# Patient Record
Sex: MALE | Race: WHITE | NOT HISPANIC OR LATINO | Employment: FULL TIME | URBAN - METROPOLITAN AREA
[De-identification: names, ages, dates, MRNs, and addresses within clinical notes are randomized per-mention and may not be internally consistent; named-entity substitution may affect disease eponyms.]

---

## 2021-06-19 ENCOUNTER — IMMUNIZATION (OUTPATIENT)
Dept: VACCINE CLINIC | Facility: HOSPITAL | Age: 41
End: 2021-06-19

## 2021-06-19 PROCEDURE — 0001A: CPT | Performed by: INTERNAL MEDICINE

## 2021-06-19 PROCEDURE — 91300 HC SARSCOV02 VAC 30MCG/0.3ML IM: CPT | Performed by: INTERNAL MEDICINE

## 2021-07-10 ENCOUNTER — IMMUNIZATION (OUTPATIENT)
Dept: VACCINE CLINIC | Facility: HOSPITAL | Age: 41
End: 2021-07-10

## 2021-07-10 PROCEDURE — 0002A: CPT | Performed by: INTERNAL MEDICINE

## 2021-07-10 PROCEDURE — 91300 HC SARSCOV02 VAC 30MCG/0.3ML IM: CPT | Performed by: INTERNAL MEDICINE

## 2022-08-09 ENCOUNTER — OFFICE VISIT (OUTPATIENT)
Dept: INTERNAL MEDICINE | Facility: CLINIC | Age: 42
End: 2022-08-09

## 2022-08-09 VITALS
BODY MASS INDEX: 37.74 KG/M2 | HEART RATE: 96 BPM | HEIGHT: 71 IN | SYSTOLIC BLOOD PRESSURE: 140 MMHG | OXYGEN SATURATION: 97 % | WEIGHT: 269.6 LBS | TEMPERATURE: 98 F | DIASTOLIC BLOOD PRESSURE: 100 MMHG

## 2022-08-09 DIAGNOSIS — Z00.00 ROUTINE HEALTH MAINTENANCE: ICD-10-CM

## 2022-08-09 DIAGNOSIS — D22.9 ATYPICAL NEVUS: Primary | ICD-10-CM

## 2022-08-09 DIAGNOSIS — R03.0 ELEVATED BLOOD PRESSURE READING WITHOUT DIAGNOSIS OF HYPERTENSION: ICD-10-CM

## 2022-08-09 PROCEDURE — 99214 OFFICE O/P EST MOD 30 MIN: CPT | Performed by: NURSE PRACTITIONER

## 2022-08-09 NOTE — PROGRESS NOTES
"Subjective    Calderon Jeffers is a 41 y.o. male presenting today for   Chief Complaint   Patient presents with   • Establish Care     New patient. Brown spot on left leg patient would like looked at.        History of Present Illness     Calderon Jeffers presents today as a new patient to me to establish care.   Prior PCP was no one more many years.      Current/chronic health conditions include:    There is no problem list on file for this patient.      No outpatient medications have been marked as taking for the 8/9/22 encounter (Office Visit) with Viridiana Peoples APRN.         New complaints today include:    He notes a dark skin lesion on his L LE. This has been present for years. It has not changed appreciably. It does not crust or bleed. He has never seen a Dermatologist.      The following portions of the patient's history were reviewed and updated as appropriate: allergies, current medications, problem list, past medical history, past surgical history, family history, and social history.         Objective    Vitals:    08/09/22 1332 08/09/22 1418   BP: 148/96 140/100   BP Location: Right arm    Patient Position: Sitting    Pulse: 96    Temp: 98 °F (36.7 °C)    SpO2: 97%    Weight: 122 kg (269 lb 9.6 oz)    Height: 180.3 cm (71\")      Body mass index is 37.6 kg/m².  Nursing notes and vitals reviewed.    Physical Exam  Constitutional:       General: He is not in acute distress.     Appearance: He is well-developed.   Cardiovascular:      Rate and Rhythm: Regular rhythm.      Heart sounds: Normal heart sounds.   Pulmonary:      Effort: Pulmonary effort is normal.      Breath sounds: Normal breath sounds.   Skin:         Neurological:      Mental Status: He is alert and oriented to person, place, and time.   Psychiatric:         Attention and Perception: He is attentive.         Mood and Affect: Mood and affect normal.         Speech: Speech normal.         Behavior: Behavior normal.         Thought Content: " Thought content normal.         No results found for this or any previous visit (from the past 672 hour(s)).      Assessment and Plan    Diagnoses and all orders for this visit:    1. Atypical nevus (Primary)    2. Elevated blood pressure reading without diagnosis of hypertension        1. Given contact info to schedule with Derm.    2. Check BP 2-3x/wk. Bring log to next OV.      Medications, including side effects, were discussed with the patient. Patient verbalized understanding.  The plan of care was discussed. All questions were answered. Patient verbalized understanding.        Return for fasting labs one week prior to, Annual physical.      I spent 33 minutes caring for Calderon on this date of service. This time includes time spent by me in the following activities:preparing for the visit, performing a medically appropriate examination and/or evaluation , counseling and educating the patient/family/caregiver, ordering medications, tests, or procedures, referring and communicating with other health care professionals , documenting information in the medical record and care coordination

## 2022-08-09 NOTE — PATIENT INSTRUCTIONS
Dermatology:    [facility]  [phone]      [facility]  [phone]

## 2022-11-04 ENCOUNTER — FLU SHOT (OUTPATIENT)
Dept: INTERNAL MEDICINE | Facility: CLINIC | Age: 42
End: 2022-11-04

## 2022-11-04 DIAGNOSIS — Z23 NEED FOR VACCINATION: Primary | ICD-10-CM

## 2022-11-04 PROCEDURE — 90471 IMMUNIZATION ADMIN: CPT | Performed by: NURSE PRACTITIONER

## 2022-11-04 PROCEDURE — 90686 IIV4 VACC NO PRSV 0.5 ML IM: CPT | Performed by: NURSE PRACTITIONER

## 2023-02-19 ENCOUNTER — HOSPITAL ENCOUNTER (EMERGENCY)
Facility: HOSPITAL | Age: 43
Discharge: HOME | End: 2023-02-20
Attending: EMERGENCY MEDICINE | Admitting: EMERGENCY MEDICINE
Payer: COMMERCIAL

## 2023-02-19 DIAGNOSIS — M54.32 SCIATICA OF LEFT SIDE: Primary | ICD-10-CM

## 2023-02-19 DIAGNOSIS — M48.00 CENTRAL STENOSIS OF SPINAL CANAL: ICD-10-CM

## 2023-02-19 DIAGNOSIS — M47.816 FACET ARTHRITIS OF LUMBAR REGION: ICD-10-CM

## 2023-02-19 PROCEDURE — 99283 EMERGENCY DEPT VISIT LOW MDM: CPT

## 2023-02-19 PROCEDURE — 25010000002 METHYLPREDNISOLONE PER 40 MG: Performed by: EMERGENCY MEDICINE

## 2023-02-19 PROCEDURE — 25010000002 KETOROLAC TROMETHAMINE PER 15 MG: Performed by: EMERGENCY MEDICINE

## 2023-02-19 PROCEDURE — 96372 THER/PROPH/DIAG INJ SC/IM: CPT

## 2023-02-19 RX ORDER — CYCLOBENZAPRINE HCL 10 MG
10 TABLET ORAL ONCE
Status: COMPLETED | OUTPATIENT
Start: 2023-02-19 | End: 2023-02-19

## 2023-02-19 RX ORDER — HYDROCODONE BITARTRATE AND ACETAMINOPHEN 5; 325 MG/1; MG/1
2 TABLET ORAL ONCE
Status: COMPLETED | OUTPATIENT
Start: 2023-02-19 | End: 2023-02-19

## 2023-02-19 RX ORDER — KETOROLAC TROMETHAMINE 30 MG/ML
60 INJECTION, SOLUTION INTRAMUSCULAR; INTRAVENOUS ONCE
Status: COMPLETED | OUTPATIENT
Start: 2023-02-19 | End: 2023-02-19

## 2023-02-19 RX ORDER — METHYLPREDNISOLONE SODIUM SUCCINATE 40 MG/ML
80 INJECTION, POWDER, LYOPHILIZED, FOR SOLUTION INTRAMUSCULAR; INTRAVENOUS ONCE
Status: COMPLETED | OUTPATIENT
Start: 2023-02-19 | End: 2023-02-19

## 2023-02-19 RX ADMIN — HYDROCODONE BITARTRATE AND ACETAMINOPHEN 2 TABLET: 5; 325 TABLET ORAL at 23:51

## 2023-02-19 RX ADMIN — KETOROLAC TROMETHAMINE 60 MG: 30 INJECTION, SOLUTION INTRAMUSCULAR; INTRAVENOUS at 23:51

## 2023-02-19 RX ADMIN — CYCLOBENZAPRINE 10 MG: 10 TABLET, FILM COATED ORAL at 23:51

## 2023-02-19 RX ADMIN — METHYLPREDNISOLONE SODIUM SUCCINATE 80 MG: 40 INJECTION, POWDER, FOR SOLUTION INTRAMUSCULAR; INTRAVENOUS at 23:50

## 2023-02-20 ENCOUNTER — APPOINTMENT (OUTPATIENT)
Dept: CT IMAGING | Facility: HOSPITAL | Age: 43
End: 2023-02-20
Payer: COMMERCIAL

## 2023-02-20 VITALS
HEIGHT: 71 IN | HEART RATE: 101 BPM | SYSTOLIC BLOOD PRESSURE: 140 MMHG | DIASTOLIC BLOOD PRESSURE: 89 MMHG | OXYGEN SATURATION: 99 % | TEMPERATURE: 98.3 F | WEIGHT: 240 LBS | BODY MASS INDEX: 33.6 KG/M2 | RESPIRATION RATE: 16 BRPM

## 2023-02-20 PROCEDURE — 72131 CT LUMBAR SPINE W/O DYE: CPT

## 2023-02-20 RX ORDER — HYDROCODONE BITARTRATE AND ACETAMINOPHEN 5; 325 MG/1; MG/1
1 TABLET ORAL EVERY 6 HOURS PRN
Qty: 30 TABLET | Refills: 0 | Status: SHIPPED | OUTPATIENT
Start: 2023-02-20

## 2023-02-20 RX ORDER — KETOROLAC TROMETHAMINE 10 MG/1
10 TABLET, FILM COATED ORAL EVERY 6 HOURS PRN
Qty: 20 TABLET | Refills: 0 | Status: SHIPPED | OUTPATIENT
Start: 2023-02-20

## 2023-02-20 RX ORDER — METHYLPREDNISOLONE 4 MG/1
TABLET ORAL
Qty: 21 TABLET | Refills: 0 | Status: SHIPPED | OUTPATIENT
Start: 2023-02-20

## 2023-02-20 RX ORDER — CYCLOBENZAPRINE HCL 10 MG
10 TABLET ORAL 3 TIMES DAILY
Qty: 21 TABLET | Refills: 0 | Status: SHIPPED | OUTPATIENT
Start: 2023-02-20 | End: 2023-02-28

## 2023-02-20 NOTE — ED PROVIDER NOTES
Subjective   History of Present Illness  Calderon Jeffers is a 42-year-old white male who presents secondary to pain in the left hip and buttock.  Onset of pain 2 days ago.  No injury prior to onset of symptoms.  No recent illness.  Patient describes a sharp pain shooting down posterior aspect of his buttock and left hip.  Patient has been taking ibuprofen without significant improvement.  He has been stretching.  Wife bought lidocaine patches which have helped minimally.  Patient reports his difficult sleeping last night secondary to pain.  Patient would like to be able to sleep tonight.  Thus he elected to come to the ED for evaluation.    History provided by:  Patient      Review of Systems   Constitutional: Negative for fever.   HENT: Negative for rhinorrhea.    Eyes: Negative for redness.   Respiratory: Negative for cough.    Cardiovascular: Negative for chest pain.   Gastrointestinal: Negative for abdominal pain.   Genitourinary: Negative for dysuria.   Musculoskeletal: Negative for gait problem.   Skin: Negative for rash.   Neurological: Negative for syncope.   All other systems reviewed and are negative.      History reviewed. No pertinent past medical history.    No Known Allergies    Past Surgical History:   Procedure Laterality Date   • KNEE ACL RECONSTRUCTION Right 1997   • TONSILLECTOMY  1991       Family History   Problem Relation Age of Onset   • Throat cancer Father        Social History     Socioeconomic History   • Marital status:    Tobacco Use   • Smoking status: Never   • Smokeless tobacco: Never   Vaping Use   • Vaping Use: Never used   Substance and Sexual Activity   • Alcohol use: Yes     Alcohol/week: 6.0 standard drinks     Types: 6 Standard drinks or equivalent per week   • Drug use: Never   • Sexual activity: Defer           Objective   Physical Exam  Vitals and nursing note reviewed.   Constitutional:       General: He is not in acute distress.     Appearance: Normal appearance. He  is well-developed. He is not ill-appearing, toxic-appearing or diaphoretic.      Comments: 42-year-old white male lying in bed.  Patient is a bit overweight.  He otherwise appears in good overall health.  Vital signs notable for heart rate 101 blood pressure 148/101.  Patient does appear to be in some discomfort.  He is friendly and cooperative.   HENT:      Head: Normocephalic and atraumatic.      Right Ear: Tympanic membrane, ear canal and external ear normal.      Left Ear: Tympanic membrane, ear canal and external ear normal.      Nose: Nose normal.      Mouth/Throat:      Mouth: Mucous membranes are moist.      Pharynx: Oropharynx is clear.   Eyes:      Extraocular Movements: Extraocular movements intact.      Conjunctiva/sclera: Conjunctivae normal.      Pupils: Pupils are equal, round, and reactive to light.   Cardiovascular:      Rate and Rhythm: Normal rate and regular rhythm.      Pulses: Normal pulses.      Heart sounds: Normal heart sounds. No murmur heard.    No friction rub. No gallop.   Pulmonary:      Effort: Pulmonary effort is normal. No respiratory distress.      Breath sounds: Normal breath sounds. No stridor. No wheezing or rales.   Abdominal:      General: There is no distension.      Palpations: Abdomen is soft. There is no mass.      Tenderness: There is no abdominal tenderness. There is no guarding or rebound.      Hernia: No hernia is present.   Musculoskeletal:         General: Tenderness present.      Cervical back: Normal, normal range of motion and neck supple.      Thoracic back: Decreased range of motion (Mildly decreased secondary to left low back pain.).      Lumbar back: Tenderness present. No swelling, deformity or signs of trauma. Decreased range of motion. Positive right straight leg raise test and positive left straight leg raise test.        Back:    Skin:     General: Skin is warm and dry.      Findings: No erythema or rash.   Neurological:      General: No focal deficit  present.      Mental Status: He is alert and oriented to person, place, and time.      Cranial Nerves: No cranial nerve deficit.      Deep Tendon Reflexes: Reflexes are normal and symmetric.   Psychiatric:         Mood and Affect: Mood normal.         Behavior: Behavior normal.         Procedures           ED Course  ED Course as of 02/20/23 0044   Sun Feb 19, 2023   2345 Pain left buttock and posterior hip.  Consistent with sciatica.  Positive straight leg raise bilaterally.  Obtaining CT of L-spine.  Giving NSAIDs, muscle relaxants, steroids and pain medication.  Wife is at bedside.  She is driving home. [SS]   2345 We will check blood pressure prior to discharge. [SS]   Mon Feb 20, 2023   0032 CT shows central canal stenosis as well as bilateral facet stenosis at L4-L5.  I feel this is the source of patient's pain. [SS]   0039 Patient feeling better.  Responded well to medications.  Discussed at length with patient and his wife all results, diagnoses, treatment and follow-up.  All questions answered.  Will DC home.    Prescription  1-Medrol Dosepak  2-Flexeril  3-Toradol  5-hydrocodone/acetaminophen [SS]      ED Course User Index  [SS] Vinod Jacobsen MD      CT Lumbar Spine Without Contrast    Result Date: 2/20/2023  Narrative: CT Spine Lumbar WO INDICATION:  Low back pain and left-sided sciatica. TECHNIQUE: CT of the lumbar spine without IV contrast. Coronal and sagittal reconstructions were obtained.  Radiation dose reduction techniques included automated exposure control or exposure modulation based on body size. Count of known CT and cardiac nuc med studies performed in previous 12 months: 0. COMPARISON:  None available. FINDINGS: Lumbar alignment is normal with no subluxation identified. There are bilateral L4 pars defects without L4-5 spondylolisthesis. There is no sacroiliac joint diastasis. There is no acute lumbar spine fracture. Paraspinal soft tissues are within normal limits. The visualized aorta  is normal in caliber. There is mild chronic wedging at T12. At L5-S1, the disc is normal. There is mild facet arthropathy. No central canal or foraminal stenosis. At L4-5, there is a broad-based disc osteophyte complex slightly eccentric to the left. This measures up to about 6 mm in depth. There is bilateral facet arthropathy in addition to the L4 pars defects. There is moderate right and moderate to severe left foraminal stenosis in addition to moderate stenosis of the central canal. At L3-4, L2-3 and L1-2, the discs are normal with no central canal or foraminal stenosis. Mild facet arthropathy is present.     Impression: 1. No fracture or malalignment. 2. Bilateral L4 pars defects. 3. Broad-based disc osteophyte complex at L4-5 eccentric to the left with bilateral foraminal stenosis and central canal stenosis as above. Signer Name: Calderon Zee MD  Signed: 2/20/2023 12:28 AM  Workstation Name: [id number]  [facility]    My differential diagnosis for back pain includes but is not limited to:  Musculoskeletal strain, contusion, retroperitoneal hematoma, disc protrusion, vertebral fracture, transverse process fracture, rib fracture, facet syndrome, sacroiliac joint strain, sciatica, renal injury, splenic injury, pancreatic injury, osteoarthritis, lumbar spondylosis, spinal stenosis, ankylosing spondylitis, sacroiliac joint inflammation, pancreatitis, perforated peptic ulcer, diverticulitis, endometriosis, chronic PID, epidural abscess, osteomyelitis, retroperitoneal abscess, pyelonephritis, pneumonia, subphrenic abscess, tuberculosis, neurofibroma, meningioma, multiple myeloma, lymphoma, metastatic cancer, primary cancer, AAA, aortic dissection, spinal ischemia, referred pain, ureterolithiasis    My diagnosis for lower extremity pain and injury includes but is not limited to hip fracture, femur fracture, hip dislocation, hip contusion, hip sprain, hip strain, pelvic fracture,  ischio-tibial band pain, ischio-tibial band bursitis, knee sprain, patella dislocation, knee dislocation, internal derangement of knee, fractures of the femur, tibia, fibula, ankle, foot and digits, ankle sprain, ankle dislocation, Lisfranc fracture, fracture dislocations of the digits, pulmonary embolism, claudication, peripheral vascular disease, gout, osteoarthritis, rheumatoid arthritis, bursitis, septic joint, poly-rheumatica, polyarthralgia and other inflammatory or infectious disease processes.                                       MDM    Final diagnoses:   Sciatica of left side   Facet arthritis of lumbar region   Central stenosis of spinal canal       ED Disposition  ED Disposition     ED Disposition   Discharge    Condition   Stable    Comment   --             No follow-up provider specified.       Medication List      New Prescriptions    cyclobenzaprine 10 MG tablet  Commonly known as: FLEXERIL  Take 1 tablet by mouth 3 (Three) Times a Day for 7 days. Take one half tab if full tab is too sedating.     HYDROcodone-acetaminophen 5-325 MG per tablet  Commonly known as: NORCO  Take 1 tablet by mouth Every 6 (Six) Hours As Needed for Moderate Pain or Severe Pain (2 tabs if needed) for up to 30 doses.     ketorolac 10 MG tablet  Commonly known as: TORADOL  Take 1 tablet by mouth Every 6 (Six) Hours As Needed for Moderate Pain for up to 20 doses.     methylPREDNISolone 4 MG dose pack  Commonly known as: MEDROL  Take as directed on package instructions.           Where to Get Your Medications      These medications were sent to [facility] [place] [address]    Hours: 7:00AM TO 5:00PM MON TO FRI Phone: [phone]   · cyclobenzaprine 10 MG tablet  · HYDROcodone-acetaminophen 5-325 MG per tablet  · ketorolac 10 MG tablet  · methylPREDNISolone 4 MG dose pack          Vinod Jacobsen MD  02/20/23 0044

## 2023-02-28 ENCOUNTER — PATIENT OUTREACH (OUTPATIENT)
Dept: CASE MANAGEMENT | Facility: OTHER | Age: 43
End: 2023-02-28
Payer: COMMERCIAL

## 2023-02-28 NOTE — OUTREACH NOTE
AMBULATORY CASE MANAGEMENT NOTE    Name and Relationship of Patient/Support Person: Calderon Jeffers - Self  Education Documentation  sleep/rest, taught by Uyen Diana, RN at 2/28/2023  2:05 PM.  Learner: Patient  Readiness: Eager  Method: Explanation  Response: Verbalizes Understanding    pain management, taught by Uyen Diana, RAYNA at 2/28/2023  2:05 PM.  Learner: Patient  Readiness: Eager  Method: Explanation  Response: Verbalizes Understanding    medication management, taught by Uyen Diana, RAYNA at 2/28/2023  2:05 PM.  Learner: Patient  Readiness: Eager  Method: Explanation  Response: Verbalizes Understanding    coping strategies, taught by Uyen Diana, RAYNA at 2/28/2023  2:05 PM.  Learner: Patient  Readiness: Eager  Method: Explanation  Response: Verbalizes Understanding    back health, taught by Uyen Diana, RAYNA at 2/28/2023  2:05 PM.  Learner: Patient  Readiness: Eager  Method: Explanation  Response: Verbalizes Understanding        Adult Patient Profile  Questions/Answers    Flowsheet Row Most Recent Value   Symptoms/Conditions Managed at Home musculoskeletal   Barriers to Managing Health none   Musculoskeletal Symptoms/Conditions back pain   Musculoskeletal Management Strategies activity, medication therapy   Musculoskeletal Self-Management Outcome 3 (uncertain)   Taking Medications Not Prescribed no   Missed Doses of Prescribed Medications During Past Week no   Taken Prescribed Medications at Different Time or Schedule During Past Week no   Taken More or Less Medication Than Prescribed no   Barriers to Taking Medication as Prescribed none   How to be Addressed Dustin   How Well Do You Speak English? very well   Source of Information patient        Adult Wellness Assessment  Questions/Answers    Flowsheet Row Most Recent Value   Help with Reading Health-Related Information never   Problems Learning about Medical Condition never   Confidence in Filling Out Forms by Self never        Send  Education  Questions/Answers    Flowsheet Row Most Recent Value   Annual Wellness Visit:  Patient Will Schedule   Other Patient Education/Resources  24/7 [facility] Nurse Call Line   24/7 Nurse Call Line Education Method Verbal   Advanced Directives: Not Interested At This Time      Patient Outreach    I contacted Dustin to follow up on his ED visit for sciatica/back pain.  He states he is feeling much better.  I reminded him he has fasting labs awaiting completion.  I encouraged him to call and schedule his annual physical.  He agreed to a call back to check in.      As with any education provided during the call, the patient was reminded to check with their MD before making any changes to their current health regimen. Educated on availability of nurseline and its use.  All basic needs are met. No issue with social determinants.  No inabilities to obtain food or medications or transportation to MD appointments. Educated on participating in habits that prevent the spread of COVID virus with home & work hygiene. Patient verbalizes understanding.  Educated patient on benefits of Employee CM program and invited to call with any new needs.     FRANTZ OLIVA  Ambulatory Case Management    2/28/2023, 14:06 EST

## 2023-06-07 ENCOUNTER — PATIENT OUTREACH (OUTPATIENT)
Dept: CASE MANAGEMENT | Facility: OTHER | Age: 43
End: 2023-06-07
Payer: COMMERCIAL

## 2023-06-07 NOTE — OUTREACH NOTE
"AMBULATORY CASE MANAGEMENT NOTE    Name and Relationship of Patient/Support Person: Calderon Jeffers \"Dustin\" - Self    Sent email reminding him to schedule annual physical and complete his labs.      FRANTZ OLIVA  Ambulatory Case Management    6/7/2023, 15:28 EDT  "

## 2023-08-01 ENCOUNTER — PATIENT OUTREACH (OUTPATIENT)
Dept: CASE MANAGEMENT | Facility: OTHER | Age: 43
End: 2023-08-01
Payer: COMMERCIAL

## 2023-10-13 ENCOUNTER — PATIENT OUTREACH (OUTPATIENT)
Dept: CASE MANAGEMENT | Facility: OTHER | Age: 43
End: 2023-10-13
Payer: COMMERCIAL

## 2023-10-13 NOTE — OUTREACH NOTE
"AMBULATORY CASE MANAGEMENT NOTE    Name and Relationship of Patient/Support Person: Calderon Jeffers \"Dustin\" - Self    Today Dustin responded to my most recent I originally engaged Dustin after he reported to the ED in February for sciatic nerve pain.  He has had no more visits to the ed.  I have been reminding him to complete an annual physical and to remind him to complete over due labs.      He agreed to receive a ECM survey.      As with any education provided during the call, the patient was reminded to check with their MD before making any changes to their current health regimen.  Educated on availability of nurseline and its use.  All basic needs are met. No issue with social determinants.  No inabilities to obtain food or medications or transportation to MD appointments.  Educated patient on benefits of Employee CM program and invited to call with any new needs.    FRANTZ OLIVA  Ambulatory Case Management    10/13/2023, 15:25 EDT  "

## 2024-05-06 ENCOUNTER — HOSPITAL ENCOUNTER (EMERGENCY)
Facility: HOSPITAL | Age: 44
Discharge: HOME | End: 2024-05-06
Attending: STUDENT IN AN ORGANIZED HEALTH CARE EDUCATION/TRAINING PROGRAM | Admitting: STUDENT IN AN ORGANIZED HEALTH CARE EDUCATION/TRAINING PROGRAM
Payer: COMMERCIAL

## 2024-05-06 ENCOUNTER — APPOINTMENT (OUTPATIENT)
Dept: GENERAL RADIOLOGY | Facility: HOSPITAL | Age: 44
End: 2024-05-06
Payer: COMMERCIAL

## 2024-05-06 VITALS
DIASTOLIC BLOOD PRESSURE: 100 MMHG | RESPIRATION RATE: 18 BRPM | SYSTOLIC BLOOD PRESSURE: 156 MMHG | WEIGHT: 240 LBS | OXYGEN SATURATION: 99 % | HEIGHT: 71 IN | HEART RATE: 104 BPM | TEMPERATURE: 98.3 F | BODY MASS INDEX: 33.6 KG/M2

## 2024-05-06 DIAGNOSIS — T56.0X1A LEAD-INDUCED ACUTE GOUT OF LEFT KNEE, INITIAL ENCOUNTER: Primary | ICD-10-CM

## 2024-05-06 DIAGNOSIS — M10.162 LEAD-INDUCED ACUTE GOUT OF LEFT KNEE, INITIAL ENCOUNTER: Primary | ICD-10-CM

## 2024-05-06 PROCEDURE — 63710000001 PREDNISONE PER 1 MG: Performed by: STUDENT IN AN ORGANIZED HEALTH CARE EDUCATION/TRAINING PROGRAM

## 2024-05-06 PROCEDURE — 99283 EMERGENCY DEPT VISIT LOW MDM: CPT

## 2024-05-06 PROCEDURE — 73562 X-RAY EXAM OF KNEE 3: CPT

## 2024-05-06 RX ORDER — INDOMETHACIN 50 MG/1
50 CAPSULE ORAL
Qty: 15 CAPSULE | Refills: 0 | Status: SHIPPED | OUTPATIENT
Start: 2024-05-06 | End: 2024-05-12

## 2024-05-06 RX ORDER — INDOMETHACIN 25 MG/1
50 CAPSULE ORAL ONCE
Status: COMPLETED | OUTPATIENT
Start: 2024-05-06 | End: 2024-05-06

## 2024-05-06 RX ORDER — HYDROCODONE BITARTRATE AND ACETAMINOPHEN 5; 325 MG/1; MG/1
1 TABLET ORAL ONCE
Status: COMPLETED | OUTPATIENT
Start: 2024-05-06 | End: 2024-05-06

## 2024-05-06 RX ORDER — PREDNISONE 20 MG/1
40 TABLET ORAL ONCE
Status: COMPLETED | OUTPATIENT
Start: 2024-05-06 | End: 2024-05-06

## 2024-05-06 RX ORDER — PREDNISONE 10 MG/1
TABLET ORAL
Qty: 18 TABLET | Refills: 0 | Status: SHIPPED | OUTPATIENT
Start: 2024-05-06 | End: 2024-05-15

## 2024-05-06 RX ADMIN — PREDNISONE 40 MG: 20 TABLET ORAL at 18:36

## 2024-05-06 RX ADMIN — HYDROCODONE BITARTRATE AND ACETAMINOPHEN 1 TABLET: 5; 325 TABLET ORAL at 18:36

## 2024-05-06 RX ADMIN — INDOMETHACIN 50 MG: 25 CAPSULE ORAL at 19:15

## 2024-05-07 ENCOUNTER — TELEPHONE (OUTPATIENT)
Dept: ORTHOPEDIC SURGERY | Facility: CLINIC | Age: 44
End: 2024-05-07
Payer: COMMERCIAL

## 2025-08-21 ENCOUNTER — E-VISIT (OUTPATIENT)
Dept: ADMINISTRATIVE | Facility: OTHER | Age: 45
End: 2025-08-21
Payer: COMMERCIAL